# Patient Record
Sex: MALE | NOT HISPANIC OR LATINO | URBAN - METROPOLITAN AREA
[De-identification: names, ages, dates, MRNs, and addresses within clinical notes are randomized per-mention and may not be internally consistent; named-entity substitution may affect disease eponyms.]

---

## 2023-05-01 LAB
ALANINE AMINOTRANSFERASE (SGPT) (U/L) IN SER/PLAS: 31 U/L (ref 10–52)
ALBUMIN (G/DL) IN SER/PLAS: 4.6 G/DL (ref 3.4–5)
ALKALINE PHOSPHATASE (U/L) IN SER/PLAS: 69 U/L (ref 33–136)
ANION GAP IN SER/PLAS: 13 MMOL/L (ref 10–20)
ASPARTATE AMINOTRANSFERASE (SGOT) (U/L) IN SER/PLAS: 29 U/L (ref 9–39)
BASOPHILS (10*3/UL) IN BLOOD BY AUTOMATED COUNT: 0.11 X10E9/L (ref 0–0.1)
BASOPHILS/100 LEUKOCYTES IN BLOOD BY AUTOMATED COUNT: 1.2 % (ref 0–2)
BILIRUBIN TOTAL (MG/DL) IN SER/PLAS: 1.7 MG/DL (ref 0–1.2)
CALCIUM (MG/DL) IN SER/PLAS: 9.9 MG/DL (ref 8.6–10.3)
CARBON DIOXIDE, TOTAL (MMOL/L) IN SER/PLAS: 28 MMOL/L (ref 21–32)
CHLORIDE (MMOL/L) IN SER/PLAS: 103 MMOL/L (ref 98–107)
CHOLESTEROL (MG/DL) IN SER/PLAS: 131 MG/DL (ref 0–199)
CHOLESTEROL IN HDL (MG/DL) IN SER/PLAS: 48.5 MG/DL
CHOLESTEROL/HDL RATIO: 2.7
CREATININE (MG/DL) IN SER/PLAS: 0.87 MG/DL (ref 0.5–1.3)
EOSINOPHILS (10*3/UL) IN BLOOD BY AUTOMATED COUNT: 0.15 X10E9/L (ref 0–0.7)
EOSINOPHILS/100 LEUKOCYTES IN BLOOD BY AUTOMATED COUNT: 1.6 % (ref 0–6)
ERYTHROCYTE DISTRIBUTION WIDTH (RATIO) BY AUTOMATED COUNT: 12.9 % (ref 11.5–14.5)
ERYTHROCYTE MEAN CORPUSCULAR HEMOGLOBIN CONCENTRATION (G/DL) BY AUTOMATED: 32.8 G/DL (ref 32–36)
ERYTHROCYTE MEAN CORPUSCULAR VOLUME (FL) BY AUTOMATED COUNT: 95 FL (ref 80–100)
ERYTHROCYTES (10*6/UL) IN BLOOD BY AUTOMATED COUNT: 4.84 X10E12/L (ref 4.5–5.9)
GFR MALE: >90 ML/MIN/1.73M2
GLUCOSE (MG/DL) IN SER/PLAS: 89 MG/DL (ref 74–99)
HEMATOCRIT (%) IN BLOOD BY AUTOMATED COUNT: 46 % (ref 41–52)
HEMOGLOBIN (G/DL) IN BLOOD: 15.1 G/DL (ref 13.5–17.5)
IMMATURE GRANULOCYTES/100 LEUKOCYTES IN BLOOD BY AUTOMATED COUNT: 0.2 % (ref 0–0.9)
LDL: 67 MG/DL (ref 0–99)
LEUKOCYTES (10*3/UL) IN BLOOD BY AUTOMATED COUNT: 9.4 X10E9/L (ref 4.4–11.3)
LYMPHOCYTES (10*3/UL) IN BLOOD BY AUTOMATED COUNT: 2.95 X10E9/L (ref 1.2–4.8)
LYMPHOCYTES/100 LEUKOCYTES IN BLOOD BY AUTOMATED COUNT: 31.4 % (ref 13–44)
MONOCYTES (10*3/UL) IN BLOOD BY AUTOMATED COUNT: 0.77 X10E9/L (ref 0.1–1)
MONOCYTES/100 LEUKOCYTES IN BLOOD BY AUTOMATED COUNT: 8.2 % (ref 2–10)
NEUTROPHILS (10*3/UL) IN BLOOD BY AUTOMATED COUNT: 5.4 X10E9/L (ref 1.2–7.7)
NEUTROPHILS/100 LEUKOCYTES IN BLOOD BY AUTOMATED COUNT: 57.4 % (ref 40–80)
PLATELETS (10*3/UL) IN BLOOD AUTOMATED COUNT: 240 X10E9/L (ref 150–450)
POTASSIUM (MMOL/L) IN SER/PLAS: 3.8 MMOL/L (ref 3.5–5.3)
PROTEIN TOTAL: 7.2 G/DL (ref 6.4–8.2)
SEDIMENTATION RATE, ERYTHROCYTE: 6 MM/H (ref 0–20)
SODIUM (MMOL/L) IN SER/PLAS: 140 MMOL/L (ref 136–145)
THYROTROPIN (MIU/L) IN SER/PLAS BY DETECTION LIMIT <= 0.05 MIU/L: 1.5 MIU/L (ref 0.44–3.98)
TRIGLYCERIDE (MG/DL) IN SER/PLAS: 80 MG/DL (ref 0–149)
UREA NITROGEN (MG/DL) IN SER/PLAS: 15 MG/DL (ref 6–23)
VLDL: 16 MG/DL (ref 0–40)

## 2023-05-02 LAB
ESTIMATED AVERAGE GLUCOSE FOR HBA1C: 117 MG/DL
HEMOGLOBIN A1C/HEMOGLOBIN TOTAL IN BLOOD: 5.7 %
PROSTATE SPECIFIC AG (NG/ML) IN SER/PLAS: 1.92 NG/ML (ref 0–4)
TESTOSTERONE (NG/DL) IN SER/PLAS: 417 NG/DL (ref 240–1000)

## 2023-08-31 PROBLEM — E11.9 TYPE 2 DIABETES MELLITUS WITHOUT COMPLICATION, WITHOUT LONG-TERM CURRENT USE OF INSULIN (MULTI): Status: ACTIVE | Noted: 2023-08-31

## 2023-08-31 PROBLEM — M54.16 LUMBAR RADICULOPATHY: Status: ACTIVE | Noted: 2023-08-31

## 2023-08-31 PROBLEM — S76.019A STRAIN OF HIP FLEXOR: Status: ACTIVE | Noted: 2023-08-31

## 2023-08-31 PROBLEM — E78.2 MIXED HYPERLIPIDEMIA: Status: ACTIVE | Noted: 2023-08-31

## 2023-08-31 PROBLEM — G62.9 PERIPHERAL NEUROPATHY: Status: ACTIVE | Noted: 2023-08-31

## 2023-08-31 PROBLEM — R35.1 NOCTURIA: Status: ACTIVE | Noted: 2023-08-31

## 2023-08-31 PROBLEM — M25.662 DECREASED RANGE OF MOTION OF LEFT KNEE: Status: ACTIVE | Noted: 2023-08-31

## 2023-08-31 PROBLEM — G56.01 CARPAL TUNNEL SYNDROME OF RIGHT WRIST: Status: ACTIVE | Noted: 2023-08-31

## 2023-08-31 PROBLEM — R20.0 LEFT LEG NUMBNESS: Status: ACTIVE | Noted: 2023-08-31

## 2023-08-31 PROBLEM — M62.838 MUSCLE SPASM: Status: ACTIVE | Noted: 2023-08-31

## 2023-08-31 PROBLEM — R30.0 DYSURIA: Status: ACTIVE | Noted: 2023-08-31

## 2023-08-31 RX ORDER — POLYETHYLENE GLYCOL 3350, SODIUM CHLORIDE, SODIUM BICARBONATE, POTASSIUM CHLORIDE 420; 11.2; 5.72; 1.48 G/4L; G/4L; G/4L; G/4L
POWDER, FOR SOLUTION ORAL
COMMUNITY
Start: 2018-11-05 | End: 2023-11-20 | Stop reason: ALTCHOICE

## 2023-08-31 RX ORDER — GLYBURIDE 5 MG/1
0.5 TABLET ORAL DAILY
COMMUNITY
Start: 2017-05-09 | End: 2023-11-20 | Stop reason: ALTCHOICE

## 2023-08-31 RX ORDER — SIMVASTATIN 40 MG/1
1 TABLET, FILM COATED ORAL NIGHTLY
COMMUNITY
Start: 2017-08-07 | End: 2023-11-20 | Stop reason: SDUPTHER

## 2023-08-31 RX ORDER — TURMERIC 95 %
POWDER (GRAM) MISCELLANEOUS
COMMUNITY

## 2023-11-20 ENCOUNTER — OFFICE VISIT (OUTPATIENT)
Dept: PRIMARY CARE | Facility: CLINIC | Age: 63
End: 2023-11-20
Payer: COMMERCIAL

## 2023-11-20 VITALS
DIASTOLIC BLOOD PRESSURE: 76 MMHG | OXYGEN SATURATION: 97 % | BODY MASS INDEX: 27.32 KG/M2 | TEMPERATURE: 97.5 F | HEART RATE: 73 BPM | WEIGHT: 164.2 LBS | SYSTOLIC BLOOD PRESSURE: 124 MMHG

## 2023-11-20 DIAGNOSIS — E78.2 MIXED HYPERLIPIDEMIA: ICD-10-CM

## 2023-11-20 DIAGNOSIS — E11.9 TYPE 2 DIABETES MELLITUS WITHOUT COMPLICATION, WITHOUT LONG-TERM CURRENT USE OF INSULIN (MULTI): Primary | ICD-10-CM

## 2023-11-20 DIAGNOSIS — Z12.5 SCREENING FOR PROSTATE CANCER: ICD-10-CM

## 2023-11-20 DIAGNOSIS — M15.9 GENERALIZED OSTEOARTHRITIS OF MULTIPLE SITES: ICD-10-CM

## 2023-11-20 PROBLEM — S76.019A STRAIN OF HIP FLEXOR: Status: RESOLVED | Noted: 2023-08-31 | Resolved: 2023-11-20

## 2023-11-20 PROBLEM — M25.662 DECREASED RANGE OF MOTION OF LEFT KNEE: Status: RESOLVED | Noted: 2023-08-31 | Resolved: 2023-11-20

## 2023-11-20 PROBLEM — R20.0 LEFT LEG NUMBNESS: Status: RESOLVED | Noted: 2023-08-31 | Resolved: 2023-11-20

## 2023-11-20 PROBLEM — R30.0 DYSURIA: Status: RESOLVED | Noted: 2023-08-31 | Resolved: 2023-11-20

## 2023-11-20 PROBLEM — R35.1 NOCTURIA: Status: RESOLVED | Noted: 2023-08-31 | Resolved: 2023-11-20

## 2023-11-20 PROBLEM — M54.16 LUMBAR RADICULOPATHY: Status: RESOLVED | Noted: 2023-08-31 | Resolved: 2023-11-20

## 2023-11-20 PROBLEM — M62.838 MUSCLE SPASM: Status: RESOLVED | Noted: 2023-08-31 | Resolved: 2023-11-20

## 2023-11-20 LAB — POC HEMOGLOBIN A1C: 5.4 % (ref 4.2–6.5)

## 2023-11-20 PROCEDURE — 99213 OFFICE O/P EST LOW 20 MIN: CPT | Performed by: FAMILY MEDICINE

## 2023-11-20 PROCEDURE — 3078F DIAST BP <80 MM HG: CPT | Performed by: FAMILY MEDICINE

## 2023-11-20 PROCEDURE — 1036F TOBACCO NON-USER: CPT | Performed by: FAMILY MEDICINE

## 2023-11-20 PROCEDURE — 3044F HG A1C LEVEL LT 7.0%: CPT | Performed by: FAMILY MEDICINE

## 2023-11-20 PROCEDURE — 3074F SYST BP LT 130 MM HG: CPT | Performed by: FAMILY MEDICINE

## 2023-11-20 PROCEDURE — 83036 HEMOGLOBIN GLYCOSYLATED A1C: CPT | Performed by: FAMILY MEDICINE

## 2023-11-20 RX ORDER — ASPIRIN 81 MG/1
81 TABLET ORAL DAILY
COMMUNITY

## 2023-11-20 RX ORDER — SIMVASTATIN 40 MG/1
40 TABLET, FILM COATED ORAL NIGHTLY
Qty: 90 TABLET | Refills: 3 | Status: SHIPPED | OUTPATIENT
Start: 2023-11-20 | End: 2024-01-26 | Stop reason: SDUPTHER

## 2023-11-20 RX ORDER — MELOXICAM 15 MG/1
15 TABLET ORAL DAILY
Qty: 90 TABLET | Refills: 3 | Status: SHIPPED | OUTPATIENT
Start: 2023-11-20 | End: 2024-11-19

## 2023-11-20 ASSESSMENT — ENCOUNTER SYMPTOMS
LOSS OF SENSATION IN FEET: 0
OCCASIONAL FEELINGS OF UNSTEADINESS: 0
DEPRESSION: 0

## 2023-11-20 ASSESSMENT — PATIENT HEALTH QUESTIONNAIRE - PHQ9
2. FEELING DOWN, DEPRESSED OR HOPELESS: NOT AT ALL
1. LITTLE INTEREST OR PLEASURE IN DOING THINGS: NOT AT ALL
SUM OF ALL RESPONSES TO PHQ9 QUESTIONS 1 & 2: 0

## 2023-11-20 ASSESSMENT — LIFESTYLE VARIABLES
AUDIT-C TOTAL SCORE: 0
HOW MANY STANDARD DRINKS CONTAINING ALCOHOL DO YOU HAVE ON A TYPICAL DAY: PATIENT DOES NOT DRINK
HOW OFTEN DO YOU HAVE SIX OR MORE DRINKS ON ONE OCCASION: NEVER
HOW OFTEN DO YOU HAVE A DRINK CONTAINING ALCOHOL: NEVER
SKIP TO QUESTIONS 9-10: 1

## 2023-11-20 ASSESSMENT — PAIN SCALES - GENERAL: PAINLEVEL: 0-NO PAIN

## 2023-11-20 NOTE — PATIENT INSTRUCTIONS
For Dm2 - A1c is improved for 5.4.  Due for eye exam when you return.  Follow up in May.      Blood work 1 week prior.

## 2023-11-20 NOTE — PROGRESS NOTES
Subjective   Patient ID: Napoleon Merritt is a 63 y.o. male who presents for Diabetes.    For DM2:  A1c: 5.4  Follow up: Doing well.  Having dental work.    Hypoglycemic Episodes: none  Glucose monitoring:  rare    Dyslipidemia  -Patient presents for evaluation of lipids.   -Compliance with treatment thus far has been good.   -A repeat lipid profile was done.   -Medication: simvastatin           Review of Systems    Objective   /76 (BP Location: Right arm, Patient Position: Sitting)   Pulse 73   Temp 36.4 °C (97.5 °F) (Temporal)   Wt 74.5 kg (164 lb 3.2 oz)   SpO2 97%   BMI 27.32 kg/m²     Physical Exam  Vitals reviewed.   Constitutional:       General: He is not in acute distress.  Cardiovascular:      Rate and Rhythm: Normal rate and regular rhythm.   Pulmonary:      Effort: Pulmonary effort is normal.      Breath sounds: No wheezing or rhonchi.   Musculoskeletal:      Right lower leg: No edema.      Left lower leg: No edema.   Lymphadenopathy:      Cervical: No cervical adenopathy.   Neurological:      Mental Status: He is alert.         Assessment/Plan   Diagnoses and all orders for this visit:  Type 2 diabetes mellitus without complication, without long-term current use of insulin (CMS/[facility])  Mixed hyperlipidemia  Screening for prostate cancer

## 2024-01-17 ENCOUNTER — TELEPHONE (OUTPATIENT)
Dept: PRIMARY CARE | Facility: CLINIC | Age: 64
End: 2024-01-17
Payer: COMMERCIAL

## 2024-01-26 DIAGNOSIS — E78.2 MIXED HYPERLIPIDEMIA: ICD-10-CM

## 2024-01-26 RX ORDER — SIMVASTATIN 40 MG/1
40 TABLET, FILM COATED ORAL NIGHTLY
Qty: 90 TABLET | Refills: 1 | Status: SHIPPED | OUTPATIENT
Start: 2024-01-26 | End: 2024-06-05 | Stop reason: SDUPTHER

## 2024-01-26 NOTE — TELEPHONE ENCOUNTER
Pt is calling for a 90 d supply. He's in [place] until spring.    Refill:  simvastatin (Zocor) 40 mg tablet Take 1 tablet (40 mg) by mouth once daily at bedtime.     Pharm:  [facility] [place]

## 2024-01-26 NOTE — TELEPHONE ENCOUNTER
Requested Prescriptions     Pending Prescriptions Disp Refills    simvastatin (Zocor) 40 mg tablet 90 tablet 1     Sig: Take 1 tablet (40 mg) by mouth once daily at bedtime.

## 2024-05-22 ENCOUNTER — LAB (OUTPATIENT)
Dept: LAB | Facility: LAB | Age: 64
End: 2024-05-22
Payer: COMMERCIAL

## 2024-05-22 ENCOUNTER — OFFICE VISIT (OUTPATIENT)
Dept: PRIMARY CARE | Facility: CLINIC | Age: 64
End: 2024-05-22
Payer: COMMERCIAL

## 2024-05-22 VITALS
OXYGEN SATURATION: 97 % | SYSTOLIC BLOOD PRESSURE: 128 MMHG | WEIGHT: 161.4 LBS | TEMPERATURE: 99.4 F | HEART RATE: 92 BPM | BODY MASS INDEX: 26.86 KG/M2 | DIASTOLIC BLOOD PRESSURE: 84 MMHG

## 2024-05-22 DIAGNOSIS — E11.9 TYPE 2 DIABETES MELLITUS WITHOUT COMPLICATION, WITHOUT LONG-TERM CURRENT USE OF INSULIN (MULTI): ICD-10-CM

## 2024-05-22 DIAGNOSIS — E78.2 MIXED HYPERLIPIDEMIA: ICD-10-CM

## 2024-05-22 DIAGNOSIS — Z12.5 SCREENING FOR PROSTATE CANCER: ICD-10-CM

## 2024-05-22 DIAGNOSIS — Z12.11 SCREEN FOR COLON CANCER: ICD-10-CM

## 2024-05-22 DIAGNOSIS — Z00.00 ROUTINE GENERAL MEDICAL EXAMINATION AT A HEALTH CARE FACILITY: Primary | ICD-10-CM

## 2024-05-22 DIAGNOSIS — R03.0 ELEVATED BLOOD PRESSURE READING IN OFFICE WITHOUT DIAGNOSIS OF HYPERTENSION: ICD-10-CM

## 2024-05-22 LAB
ALBUMIN SERPL BCP-MCNC: 4.9 G/DL (ref 3.4–5)
ALP SERPL-CCNC: 59 U/L (ref 33–136)
ALT SERPL W P-5'-P-CCNC: 23 U/L (ref 10–52)
ANION GAP SERPL CALC-SCNC: 13 MMOL/L (ref 10–20)
AST SERPL W P-5'-P-CCNC: 20 U/L (ref 9–39)
BASOPHILS # BLD AUTO: 0.09 X10*3/UL (ref 0–0.1)
BASOPHILS NFR BLD AUTO: 1.2 %
BILIRUB SERPL-MCNC: 1.8 MG/DL (ref 0–1.2)
BUN SERPL-MCNC: 18 MG/DL (ref 6–23)
CALCIUM SERPL-MCNC: 9.6 MG/DL (ref 8.6–10.3)
CHLORIDE SERPL-SCNC: 104 MMOL/L (ref 98–107)
CHOLEST SERPL-MCNC: 149 MG/DL (ref 0–199)
CHOLESTEROL/HDL RATIO: 3
CO2 SERPL-SCNC: 28 MMOL/L (ref 21–32)
CREAT SERPL-MCNC: 0.91 MG/DL (ref 0.5–1.3)
EGFRCR SERPLBLD CKD-EPI 2021: >90 ML/MIN/1.73M*2
EOSINOPHIL # BLD AUTO: 0.1 X10*3/UL (ref 0–0.7)
EOSINOPHIL NFR BLD AUTO: 1.3 %
ERYTHROCYTE [DISTWIDTH] IN BLOOD BY AUTOMATED COUNT: 12.2 % (ref 11.5–14.5)
GLUCOSE SERPL-MCNC: 103 MG/DL (ref 74–99)
HCT VFR BLD AUTO: 47.2 % (ref 41–52)
HDLC SERPL-MCNC: 50.3 MG/DL
HGB BLD-MCNC: 16.2 G/DL (ref 13.5–17.5)
IMM GRANULOCYTES # BLD AUTO: 0.04 X10*3/UL (ref 0–0.7)
IMM GRANULOCYTES NFR BLD AUTO: 0.5 % (ref 0–0.9)
LDLC SERPL CALC-MCNC: 79 MG/DL
LYMPHOCYTES # BLD AUTO: 2.36 X10*3/UL (ref 1.2–4.8)
LYMPHOCYTES NFR BLD AUTO: 31.4 %
MCH RBC QN AUTO: 31.6 PG (ref 26–34)
MCHC RBC AUTO-ENTMCNC: 34.3 G/DL (ref 32–36)
MCV RBC AUTO: 92 FL (ref 80–100)
MONOCYTES # BLD AUTO: 0.61 X10*3/UL (ref 0.1–1)
MONOCYTES NFR BLD AUTO: 8.1 %
NEUTROPHILS # BLD AUTO: 4.32 X10*3/UL (ref 1.2–7.7)
NEUTROPHILS NFR BLD AUTO: 57.5 %
NON HDL CHOLESTEROL: 99 MG/DL (ref 0–149)
NRBC BLD-RTO: 0 /100 WBCS (ref 0–0)
PLATELET # BLD AUTO: 235 X10*3/UL (ref 150–450)
POC HEMOGLOBIN A1C: 5.4 % (ref 4.2–6.5)
POTASSIUM SERPL-SCNC: 4.3 MMOL/L (ref 3.5–5.3)
PROT SERPL-MCNC: 7 G/DL (ref 6.4–8.2)
PSA SERPL-MCNC: 2.09 NG/ML
RBC # BLD AUTO: 5.13 X10*6/UL (ref 4.5–5.9)
SODIUM SERPL-SCNC: 141 MMOL/L (ref 136–145)
TRIGL SERPL-MCNC: 100 MG/DL (ref 0–149)
VLDL: 20 MG/DL (ref 0–40)
WBC # BLD AUTO: 7.5 X10*3/UL (ref 4.4–11.3)

## 2024-05-22 PROCEDURE — 99396 PREV VISIT EST AGE 40-64: CPT | Performed by: FAMILY MEDICINE

## 2024-05-22 PROCEDURE — 82043 UR ALBUMIN QUANTITATIVE: CPT

## 2024-05-22 PROCEDURE — 80061 LIPID PANEL: CPT

## 2024-05-22 PROCEDURE — 83036 HEMOGLOBIN GLYCOSYLATED A1C: CPT | Performed by: FAMILY MEDICINE

## 2024-05-22 PROCEDURE — 80053 COMPREHEN METABOLIC PANEL: CPT

## 2024-05-22 PROCEDURE — 3079F DIAST BP 80-89 MM HG: CPT | Performed by: FAMILY MEDICINE

## 2024-05-22 PROCEDURE — 84153 ASSAY OF PSA TOTAL: CPT

## 2024-05-22 PROCEDURE — 85025 COMPLETE CBC W/AUTO DIFF WBC: CPT

## 2024-05-22 PROCEDURE — 36415 COLL VENOUS BLD VENIPUNCTURE: CPT

## 2024-05-22 PROCEDURE — 1036F TOBACCO NON-USER: CPT | Performed by: FAMILY MEDICINE

## 2024-05-22 PROCEDURE — 3074F SYST BP LT 130 MM HG: CPT | Performed by: FAMILY MEDICINE

## 2024-05-22 PROCEDURE — 82570 ASSAY OF URINE CREATININE: CPT

## 2024-05-22 ASSESSMENT — ENCOUNTER SYMPTOMS
DEPRESSION: 0
LOSS OF SENSATION IN FEET: 0
OCCASIONAL FEELINGS OF UNSTEADINESS: 0

## 2024-05-22 ASSESSMENT — LIFESTYLE VARIABLES
HOW OFTEN DO YOU HAVE SIX OR MORE DRINKS ON ONE OCCASION: NEVER
HOW MANY STANDARD DRINKS CONTAINING ALCOHOL DO YOU HAVE ON A TYPICAL DAY: PATIENT DOES NOT DRINK
HOW OFTEN DO YOU HAVE A DRINK CONTAINING ALCOHOL: NEVER
SKIP TO QUESTIONS 9-10: 1
AUDIT-C TOTAL SCORE: 0

## 2024-05-22 ASSESSMENT — PAIN SCALES - GENERAL: PAINLEVEL: 0-NO PAIN

## 2024-05-22 ASSESSMENT — PATIENT HEALTH QUESTIONNAIRE - PHQ9
2. FEELING DOWN, DEPRESSED OR HOPELESS: NOT AT ALL
SUM OF ALL RESPONSES TO PHQ9 QUESTIONS 1 & 2: 0
1. LITTLE INTEREST OR PLEASURE IN DOING THINGS: NOT AT ALL

## 2024-05-22 NOTE — PROGRESS NOTES
Subjective   Patient ID: Napoleon Merritt is a 64 y.o. male who presents for Annual Exam.    Here for annual physical    Blood pressure - pt has been having elevated readings.  Patient states readings have been in the 140's/mid 90's. No medications currently.  Asymptomatic. Family history of heart disease and stroke.     For DM2:  A1c: 5.4  Follow up: Doing well.  Having dental work.    Hypoglycemic Episodes: none  Glucose monitoring:  rare    Dyslipidemia  -Patient presents for evaluation of lipids.   -Compliance with treatment thus far has been good.   -A repeat lipid profile was done.   -Medication: simvastatin           Review of Systems    Objective   /82 (BP Location: Right arm, Patient Position: Sitting)   Pulse 92   Temp 37.4 °C (99.4 °F) (Temporal)   Wt 73.2 kg (161 lb 6.4 oz)   SpO2 97%   BMI 26.86 kg/m²     Physical Exam  Vitals reviewed.   Constitutional:       General: He is not in acute distress.  Cardiovascular:      Rate and Rhythm: Normal rate and regular rhythm.   Pulmonary:      Effort: Pulmonary effort is normal.      Breath sounds: No wheezing or rhonchi.   Musculoskeletal:      Right lower leg: No edema.      Left lower leg: No edema.   Lymphadenopathy:      Cervical: No cervical adenopathy.   Neurological:      Mental Status: He is alert.         Assessment/Plan   Diagnoses and all orders for this visit:  Routine general medical examination at a health care facility  Type 2 diabetes mellitus without complication, without long-term current use of insulin (Multi)  Mixed hyperlipidemia  Elevated blood pressure reading in office without diagnosis of hypertension  Screen for colon cancer    Patient Instructions   Here for physical.  Order for blood work in system.  Order for cologuard.     For DM2- A1c is 5.4.  Diet controlled.  Continue exercise, weight loss.      For blood pressure - readings have been up and down.  Recommend nurse visits for 4 weeks.  If normal we will continue  lifestyle changes.  If elevated we will start low dose of losartan.      Recheck in 6 months.

## 2024-05-22 NOTE — PATIENT INSTRUCTIONS
Here for physical.  Order for blood work in system.  Order for cologuard.     For DM2- A1c is 5.4.  Diet controlled.  Continue exercise, weight loss.      For blood pressure - readings have been up and down.  Recommend nurse visits for 4 weeks.  If normal we will continue lifestyle changes.  If elevated we will start low dose of losartan.      Recheck in 6 months.

## 2024-05-23 LAB
CREAT UR-MCNC: 77.8 MG/DL (ref 20–370)
MICROALBUMIN UR-MCNC: <7 MG/L
MICROALBUMIN/CREAT UR: NORMAL MG/G{CREAT}

## 2024-05-29 ENCOUNTER — CLINICAL SUPPORT (OUTPATIENT)
Dept: PRIMARY CARE | Facility: CLINIC | Age: 64
End: 2024-05-29
Payer: COMMERCIAL

## 2024-05-29 VITALS — SYSTOLIC BLOOD PRESSURE: 128 MMHG | DIASTOLIC BLOOD PRESSURE: 82 MMHG

## 2024-05-29 DIAGNOSIS — R03.0 ELEVATED BLOOD PRESSURE READING IN OFFICE WITHOUT DIAGNOSIS OF HYPERTENSION: ICD-10-CM

## 2024-06-05 ENCOUNTER — CLINICAL SUPPORT (OUTPATIENT)
Dept: PRIMARY CARE | Facility: CLINIC | Age: 64
End: 2024-06-05
Payer: COMMERCIAL

## 2024-06-05 VITALS — DIASTOLIC BLOOD PRESSURE: 78 MMHG | OXYGEN SATURATION: 97 % | HEART RATE: 83 BPM | SYSTOLIC BLOOD PRESSURE: 124 MMHG

## 2024-06-05 DIAGNOSIS — R03.0 ELEVATED BLOOD PRESSURE READING IN OFFICE WITHOUT DIAGNOSIS OF HYPERTENSION: ICD-10-CM

## 2024-06-05 DIAGNOSIS — E78.2 MIXED HYPERLIPIDEMIA: ICD-10-CM

## 2024-06-05 RX ORDER — SIMVASTATIN 40 MG/1
40 TABLET, FILM COATED ORAL NIGHTLY
Qty: 90 TABLET | Refills: 1 | Status: SHIPPED | OUTPATIENT
Start: 2024-06-05

## 2024-06-05 ASSESSMENT — PAIN SCALES - GENERAL: PAINLEVEL: 0-NO PAIN

## 2024-06-12 ENCOUNTER — APPOINTMENT (OUTPATIENT)
Dept: PRIMARY CARE | Facility: CLINIC | Age: 64
End: 2024-06-12
Payer: COMMERCIAL

## 2024-06-14 LAB — NONINV COLON CA DNA+OCC BLD SCRN STL QL: NEGATIVE

## 2024-06-19 ENCOUNTER — APPOINTMENT (OUTPATIENT)
Dept: PRIMARY CARE | Facility: CLINIC | Age: 64
End: 2024-06-19
Payer: COMMERCIAL

## 2024-09-09 ENCOUNTER — TELEPHONE (OUTPATIENT)
Dept: PRIMARY CARE | Facility: CLINIC | Age: 64
End: 2024-09-09
Payer: COMMERCIAL

## 2024-09-09 DIAGNOSIS — E78.2 MIXED HYPERLIPIDEMIA: ICD-10-CM

## 2024-09-09 RX ORDER — SIMVASTATIN 40 MG/1
40 TABLET, FILM COATED ORAL NIGHTLY
Qty: 90 TABLET | Refills: 3 | Status: SHIPPED | OUTPATIENT
Start: 2024-09-09

## 2024-11-22 ENCOUNTER — APPOINTMENT (OUTPATIENT)
Dept: PRIMARY CARE | Facility: CLINIC | Age: 64
End: 2024-11-22
Payer: COMMERCIAL

## 2024-11-26 ENCOUNTER — OFFICE VISIT (OUTPATIENT)
Dept: PRIMARY CARE | Facility: CLINIC | Age: 64
End: 2024-11-26
Payer: COMMERCIAL

## 2024-11-26 ENCOUNTER — LAB (OUTPATIENT)
Dept: LAB | Facility: LAB | Age: 64
End: 2024-11-26
Payer: COMMERCIAL

## 2024-11-26 VITALS
DIASTOLIC BLOOD PRESSURE: 84 MMHG | SYSTOLIC BLOOD PRESSURE: 146 MMHG | BODY MASS INDEX: 27.69 KG/M2 | OXYGEN SATURATION: 97 % | RESPIRATION RATE: 18 BRPM | HEART RATE: 77 BPM | WEIGHT: 166.2 LBS | HEIGHT: 65 IN | TEMPERATURE: 99.6 F

## 2024-11-26 DIAGNOSIS — Z00.00 ROUTINE GENERAL MEDICAL EXAMINATION AT A HEALTH CARE FACILITY: Primary | ICD-10-CM

## 2024-11-26 DIAGNOSIS — E78.2 MIXED HYPERLIPIDEMIA: ICD-10-CM

## 2024-11-26 DIAGNOSIS — E11.9 TYPE 2 DIABETES MELLITUS WITHOUT COMPLICATION, WITHOUT LONG-TERM CURRENT USE OF INSULIN (MULTI): ICD-10-CM

## 2024-11-26 DIAGNOSIS — I10 BENIGN ESSENTIAL HYPERTENSION: ICD-10-CM

## 2024-11-26 DIAGNOSIS — Z00.00 ROUTINE GENERAL MEDICAL EXAMINATION AT A HEALTH CARE FACILITY: ICD-10-CM

## 2024-11-26 LAB
ALBUMIN SERPL BCP-MCNC: 4.6 G/DL (ref 3.4–5)
ALP SERPL-CCNC: 66 U/L (ref 33–136)
ALT SERPL W P-5'-P-CCNC: 20 U/L (ref 10–52)
ANION GAP SERPL CALC-SCNC: 13 MMOL/L (ref 10–20)
AST SERPL W P-5'-P-CCNC: 20 U/L (ref 9–39)
BASOPHILS # BLD AUTO: 0.11 X10*3/UL (ref 0–0.1)
BASOPHILS NFR BLD AUTO: 1.4 %
BILIRUB SERPL-MCNC: 2.3 MG/DL (ref 0–1.2)
BUN SERPL-MCNC: 14 MG/DL (ref 6–23)
CALCIUM SERPL-MCNC: 9.4 MG/DL (ref 8.6–10.3)
CHLORIDE SERPL-SCNC: 104 MMOL/L (ref 98–107)
CHOLEST SERPL-MCNC: 139 MG/DL (ref 0–199)
CHOLESTEROL/HDL RATIO: 3.1
CO2 SERPL-SCNC: 27 MMOL/L (ref 21–32)
CREAT SERPL-MCNC: 0.78 MG/DL (ref 0.5–1.3)
EGFRCR SERPLBLD CKD-EPI 2021: >90 ML/MIN/1.73M*2
EOSINOPHIL # BLD AUTO: 0.12 X10*3/UL (ref 0–0.7)
EOSINOPHIL NFR BLD AUTO: 1.5 %
ERYTHROCYTE [DISTWIDTH] IN BLOOD BY AUTOMATED COUNT: 12.8 % (ref 11.5–14.5)
GLUCOSE SERPL-MCNC: 89 MG/DL (ref 74–99)
HCT VFR BLD AUTO: 47.9 % (ref 41–52)
HDLC SERPL-MCNC: 45.1 MG/DL
HGB BLD-MCNC: 15.4 G/DL (ref 13.5–17.5)
IMM GRANULOCYTES # BLD AUTO: 0.05 X10*3/UL (ref 0–0.7)
IMM GRANULOCYTES NFR BLD AUTO: 0.6 % (ref 0–0.9)
LDLC SERPL CALC-MCNC: 69 MG/DL
LYMPHOCYTES # BLD AUTO: 2.31 X10*3/UL (ref 1.2–4.8)
LYMPHOCYTES NFR BLD AUTO: 28.5 %
MCH RBC QN AUTO: 31 PG (ref 26–34)
MCHC RBC AUTO-ENTMCNC: 32.2 G/DL (ref 32–36)
MCV RBC AUTO: 97 FL (ref 80–100)
MONOCYTES # BLD AUTO: 0.68 X10*3/UL (ref 0.1–1)
MONOCYTES NFR BLD AUTO: 8.4 %
NEUTROPHILS # BLD AUTO: 4.84 X10*3/UL (ref 1.2–7.7)
NEUTROPHILS NFR BLD AUTO: 59.6 %
NON HDL CHOLESTEROL: 94 MG/DL (ref 0–149)
NRBC BLD-RTO: 0 /100 WBCS (ref 0–0)
PLATELET # BLD AUTO: 233 X10*3/UL (ref 150–450)
POC HEMOGLOBIN A1C: 5.4 % (ref 4.2–6.5)
POTASSIUM SERPL-SCNC: 4.1 MMOL/L (ref 3.5–5.3)
PROT SERPL-MCNC: 6.7 G/DL (ref 6.4–8.2)
RBC # BLD AUTO: 4.96 X10*6/UL (ref 4.5–5.9)
SODIUM SERPL-SCNC: 140 MMOL/L (ref 136–145)
TRIGL SERPL-MCNC: 123 MG/DL (ref 0–149)
TSH SERPL-ACNC: 0.91 MIU/L (ref 0.44–3.98)
VLDL: 25 MG/DL (ref 0–40)
WBC # BLD AUTO: 8.1 X10*3/UL (ref 4.4–11.3)

## 2024-11-26 PROCEDURE — 85025 COMPLETE CBC W/AUTO DIFF WBC: CPT

## 2024-11-26 PROCEDURE — 3077F SYST BP >= 140 MM HG: CPT | Performed by: FAMILY MEDICINE

## 2024-11-26 PROCEDURE — 3008F BODY MASS INDEX DOCD: CPT | Performed by: FAMILY MEDICINE

## 2024-11-26 PROCEDURE — 4010F ACE/ARB THERAPY RXD/TAKEN: CPT | Performed by: FAMILY MEDICINE

## 2024-11-26 PROCEDURE — 80053 COMPREHEN METABOLIC PANEL: CPT

## 2024-11-26 PROCEDURE — 3062F POS MACROALBUMINURIA REV: CPT | Performed by: FAMILY MEDICINE

## 2024-11-26 PROCEDURE — 84443 ASSAY THYROID STIM HORMONE: CPT

## 2024-11-26 PROCEDURE — 3079F DIAST BP 80-89 MM HG: CPT | Performed by: FAMILY MEDICINE

## 2024-11-26 PROCEDURE — 36415 COLL VENOUS BLD VENIPUNCTURE: CPT

## 2024-11-26 PROCEDURE — 99396 PREV VISIT EST AGE 40-64: CPT | Performed by: FAMILY MEDICINE

## 2024-11-26 PROCEDURE — 3048F LDL-C <100 MG/DL: CPT | Performed by: FAMILY MEDICINE

## 2024-11-26 PROCEDURE — 1036F TOBACCO NON-USER: CPT | Performed by: FAMILY MEDICINE

## 2024-11-26 PROCEDURE — 80061 LIPID PANEL: CPT

## 2024-11-26 PROCEDURE — 83036 HEMOGLOBIN GLYCOSYLATED A1C: CPT | Performed by: FAMILY MEDICINE

## 2024-11-26 RX ORDER — LOSARTAN POTASSIUM 25 MG/1
25 TABLET ORAL DAILY
Qty: 100 TABLET | Refills: 3 | Status: SHIPPED | OUTPATIENT
Start: 2024-11-26 | End: 2025-12-31

## 2024-11-26 ASSESSMENT — ENCOUNTER SYMPTOMS
OCCASIONAL FEELINGS OF UNSTEADINESS: 0
DEPRESSION: 0
LOSS OF SENSATION IN FEET: 0

## 2024-11-26 ASSESSMENT — PATIENT HEALTH QUESTIONNAIRE - PHQ9
1. LITTLE INTEREST OR PLEASURE IN DOING THINGS: NOT AT ALL
2. FEELING DOWN, DEPRESSED OR HOPELESS: NOT AT ALL
SUM OF ALL RESPONSES TO PHQ9 QUESTIONS 1 & 2: 0

## 2024-11-26 ASSESSMENT — LIFESTYLE VARIABLES
HOW OFTEN DO YOU HAVE SIX OR MORE DRINKS ON ONE OCCASION: NEVER
HOW MANY STANDARD DRINKS CONTAINING ALCOHOL DO YOU HAVE ON A TYPICAL DAY: PATIENT DOES NOT DRINK
AUDIT-C TOTAL SCORE: 0
HOW OFTEN DO YOU HAVE A DRINK CONTAINING ALCOHOL: NEVER
SKIP TO QUESTIONS 9-10: 1

## 2024-11-26 ASSESSMENT — PAIN SCALES - GENERAL: PAINLEVEL_OUTOF10: 0-NO PAIN

## 2024-11-26 NOTE — PROGRESS NOTES
"Subjective   Patient ID: Napoleon Merritt is a 64 y.o. male who presents for No chief complaint on file..    Here for physical.     Elevated BP  -Pt has been having elevated systolic readings - 135-150 systolic.  Diastolic between 80-95.     For DM2:  A1c: 5.4  Follow up: Doing well.  No new issues.    Hypoglycemic Episodes: none  Glucose monitoring:  rare    Dyslipidemia  -Patient presents for evaluation of lipids.   -Compliance with treatment thus far has been good.   -A repeat lipid profile was done.   -Medication: simvastatin           Review of Systems    Objective   /84 (BP Location: Right arm, Patient Position: Sitting)   Pulse 77   Temp 37.6 °C (99.6 °F) (Temporal)   Resp 18   Ht 1.651 m (5' 5\")   Wt 75.4 kg (166 lb 3.2 oz)   SpO2 97%   BMI 27.66 kg/m²     Physical Exam  Vitals reviewed.   Constitutional:       General: He is not in acute distress.  Cardiovascular:      Rate and Rhythm: Normal rate and regular rhythm.   Pulmonary:      Effort: Pulmonary effort is normal.      Breath sounds: No wheezing or rhonchi.   Musculoskeletal:      Right lower leg: No edema.      Left lower leg: No edema.   Lymphadenopathy:      Cervical: No cervical adenopathy.   Neurological:      Mental Status: He is alert.         Assessment/Plan   Diagnoses and all orders for this visit:  Routine general medical examination at a health care facility  Type 2 diabetes mellitus without complication, without long-term current use of insulin (Multi)  -     POCT glycosylated hemoglobin (Hb A1C) manually resulted  Mixed hyperlipidemia  Benign essential hypertension  Other orders  -     Follow Up In Primary Care - Established    Patient Instructions   Here for physical.  Up to date on prostate and colon cancer testing.      For diabetes - A1c is 5.4 - well controlled.  Diet controlled.      For cholesterol - recheck levels.     For elevated blood pressure - discussed treating.  Check out [email or web address].org for meters.  Recommend " low dose of losartan 25mg.  Please check blood pressure 2-3 times a week. Goal is <140/90. If you are above this frequently, please call for medication adjustment. Recommend upper arm electronic cuff.  We will call in 3 weeks to see how you are doing    Follow up in 6 months.

## 2024-11-26 NOTE — PATIENT INSTRUCTIONS
Here for physical.  Up to date on prostate and colon cancer testing.      For diabetes - A1c is 5.4 - well controlled.  Diet controlled.      For cholesterol - recheck levels.     For elevated blood pressure - discussed treating.  Check out [email or web address] for meters.  Recommend low dose of losartan 25mg.  Please check blood pressure 2-3 times a week. Goal is <140/90. If you are above this frequently, please call for medication adjustment. Recommend upper arm electronic cuff.  We will call in 3 weeks to see how you are doing    Follow up in 6 months.

## 2024-11-27 DIAGNOSIS — R17 ELEVATED BILIRUBIN: Primary | ICD-10-CM

## 2024-11-29 ENCOUNTER — HOSPITAL ENCOUNTER (OUTPATIENT)
Dept: RADIOLOGY | Facility: HOSPITAL | Age: 64
Discharge: HOME | End: 2024-11-29
Payer: COMMERCIAL

## 2024-11-29 DIAGNOSIS — R17 ELEVATED BILIRUBIN: ICD-10-CM

## 2024-11-29 PROCEDURE — 76705 ECHO EXAM OF ABDOMEN: CPT

## 2024-12-02 DIAGNOSIS — R17 ELEVATED BILIRUBIN: Primary | ICD-10-CM

## 2024-12-05 ENCOUNTER — HOSPITAL ENCOUNTER (EMERGENCY)
Facility: HOSPITAL | Age: 64
Discharge: HOME | End: 2024-12-06
Attending: EMERGENCY MEDICINE
Payer: COMMERCIAL

## 2024-12-05 ENCOUNTER — APPOINTMENT (OUTPATIENT)
Dept: RADIOLOGY | Facility: HOSPITAL | Age: 64
End: 2024-12-05
Payer: COMMERCIAL

## 2024-12-05 DIAGNOSIS — N23 URETERAL COLIC: Primary | ICD-10-CM

## 2024-12-05 LAB
ALBUMIN SERPL BCP-MCNC: 4.4 G/DL (ref 3.4–5)
ALP SERPL-CCNC: 62 U/L (ref 33–136)
ALT SERPL W P-5'-P-CCNC: 23 U/L (ref 10–52)
AMORPH CRY #/AREA UR COMP ASSIST: ABNORMAL /HPF
ANION GAP SERPL CALC-SCNC: 16 MMOL/L (ref 10–20)
APPEARANCE UR: ABNORMAL
AST SERPL W P-5'-P-CCNC: 27 U/L (ref 9–39)
BASOPHILS # BLD AUTO: 0.11 X10*3/UL (ref 0–0.1)
BASOPHILS NFR BLD AUTO: 0.8 %
BILIRUB SERPL-MCNC: 2.4 MG/DL (ref 0–1.2)
BILIRUB UR STRIP.AUTO-MCNC: NEGATIVE MG/DL
BUN SERPL-MCNC: 25 MG/DL (ref 6–23)
CALCIUM SERPL-MCNC: 9.3 MG/DL (ref 8.6–10.3)
CHLORIDE SERPL-SCNC: 103 MMOL/L (ref 98–107)
CO2 SERPL-SCNC: 24 MMOL/L (ref 21–32)
COLOR UR: ABNORMAL
CREAT SERPL-MCNC: 1.33 MG/DL (ref 0.5–1.3)
EGFRCR SERPLBLD CKD-EPI 2021: 60 ML/MIN/1.73M*2
EOSINOPHIL # BLD AUTO: 0.09 X10*3/UL (ref 0–0.7)
EOSINOPHIL NFR BLD AUTO: 0.7 %
ERYTHROCYTE [DISTWIDTH] IN BLOOD BY AUTOMATED COUNT: 12.5 % (ref 11.5–14.5)
GLUCOSE SERPL-MCNC: 137 MG/DL (ref 74–99)
GLUCOSE UR STRIP.AUTO-MCNC: NORMAL MG/DL
HCT VFR BLD AUTO: 41.2 % (ref 41–52)
HGB BLD-MCNC: 14.4 G/DL (ref 13.5–17.5)
HYALINE CASTS #/AREA URNS AUTO: ABNORMAL /LPF
IMM GRANULOCYTES # BLD AUTO: 0.06 X10*3/UL (ref 0–0.7)
IMM GRANULOCYTES NFR BLD AUTO: 0.5 % (ref 0–0.9)
KETONES UR STRIP.AUTO-MCNC: ABNORMAL MG/DL
LEUKOCYTE ESTERASE UR QL STRIP.AUTO: NEGATIVE
LYMPHOCYTES # BLD AUTO: 1.38 X10*3/UL (ref 1.2–4.8)
LYMPHOCYTES NFR BLD AUTO: 10.6 %
MCH RBC QN AUTO: 31 PG (ref 26–34)
MCHC RBC AUTO-ENTMCNC: 35 G/DL (ref 32–36)
MCV RBC AUTO: 89 FL (ref 80–100)
MONOCYTES # BLD AUTO: 0.65 X10*3/UL (ref 0.1–1)
MONOCYTES NFR BLD AUTO: 5 %
NEUTROPHILS # BLD AUTO: 10.72 X10*3/UL (ref 1.2–7.7)
NEUTROPHILS NFR BLD AUTO: 82.4 %
NITRITE UR QL STRIP.AUTO: NEGATIVE
NRBC BLD-RTO: 0 /100 WBCS (ref 0–0)
PH UR STRIP.AUTO: 8 [PH]
PLATELET # BLD AUTO: 237 X10*3/UL (ref 150–450)
POTASSIUM SERPL-SCNC: 4.4 MMOL/L (ref 3.5–5.3)
PROT SERPL-MCNC: 6.7 G/DL (ref 6.4–8.2)
PROT UR STRIP.AUTO-MCNC: ABNORMAL MG/DL
RBC # BLD AUTO: 4.65 X10*6/UL (ref 4.5–5.9)
RBC # UR STRIP.AUTO: ABNORMAL /UL
RBC #/AREA URNS AUTO: >20 /HPF
SODIUM SERPL-SCNC: 139 MMOL/L (ref 136–145)
SP GR UR STRIP.AUTO: 1.01
UROBILINOGEN UR STRIP.AUTO-MCNC: NORMAL MG/DL
WBC # BLD AUTO: 13 X10*3/UL (ref 4.4–11.3)
WBC #/AREA URNS AUTO: ABNORMAL /HPF

## 2024-12-05 PROCEDURE — 96375 TX/PRO/DX INJ NEW DRUG ADDON: CPT

## 2024-12-05 PROCEDURE — 80053 COMPREHEN METABOLIC PANEL: CPT | Performed by: EMERGENCY MEDICINE

## 2024-12-05 PROCEDURE — 36415 COLL VENOUS BLD VENIPUNCTURE: CPT | Performed by: EMERGENCY MEDICINE

## 2024-12-05 PROCEDURE — 74176 CT ABD & PELVIS W/O CONTRAST: CPT | Performed by: RADIOLOGY

## 2024-12-05 PROCEDURE — 99284 EMERGENCY DEPT VISIT MOD MDM: CPT | Mod: 25 | Performed by: EMERGENCY MEDICINE

## 2024-12-05 PROCEDURE — 85025 COMPLETE CBC W/AUTO DIFF WBC: CPT | Performed by: EMERGENCY MEDICINE

## 2024-12-05 PROCEDURE — 2500000004 HC RX 250 GENERAL PHARMACY W/ HCPCS (ALT 636 FOR OP/ED): Performed by: EMERGENCY MEDICINE

## 2024-12-05 PROCEDURE — 74176 CT ABD & PELVIS W/O CONTRAST: CPT

## 2024-12-05 PROCEDURE — 81001 URINALYSIS AUTO W/SCOPE: CPT | Performed by: EMERGENCY MEDICINE

## 2024-12-05 PROCEDURE — 96374 THER/PROPH/DIAG INJ IV PUSH: CPT

## 2024-12-05 RX ORDER — ONDANSETRON HYDROCHLORIDE 2 MG/ML
4 INJECTION, SOLUTION INTRAVENOUS ONCE
Status: COMPLETED | OUTPATIENT
Start: 2024-12-05 | End: 2024-12-05

## 2024-12-05 RX ORDER — KETOROLAC TROMETHAMINE 30 MG/ML
15 INJECTION, SOLUTION INTRAMUSCULAR; INTRAVENOUS ONCE
Status: COMPLETED | OUTPATIENT
Start: 2024-12-05 | End: 2024-12-05

## 2024-12-05 ASSESSMENT — PAIN DESCRIPTION - PAIN TYPE
TYPE: ACUTE PAIN
TYPE: ACUTE PAIN

## 2024-12-05 ASSESSMENT — LIFESTYLE VARIABLES
EVER FELT BAD OR GUILTY ABOUT YOUR DRINKING: NO
HAVE PEOPLE ANNOYED YOU BY CRITICIZING YOUR DRINKING: NO
HAVE YOU EVER FELT YOU SHOULD CUT DOWN ON YOUR DRINKING: NO
EVER HAD A DRINK FIRST THING IN THE MORNING TO STEADY YOUR NERVES TO GET RID OF A HANGOVER: NO
TOTAL SCORE: 0

## 2024-12-05 ASSESSMENT — COLUMBIA-SUICIDE SEVERITY RATING SCALE - C-SSRS
6. HAVE YOU EVER DONE ANYTHING, STARTED TO DO ANYTHING, OR PREPARED TO DO ANYTHING TO END YOUR LIFE?: NO
1. IN THE PAST MONTH, HAVE YOU WISHED YOU WERE DEAD OR WISHED YOU COULD GO TO SLEEP AND NOT WAKE UP?: NO
2. HAVE YOU ACTUALLY HAD ANY THOUGHTS OF KILLING YOURSELF?: NO

## 2024-12-05 ASSESSMENT — PAIN SCALES - GENERAL
PAINLEVEL_OUTOF10: 8
PAINLEVEL_OUTOF10: 4
PAINLEVEL_OUTOF10: 10 - WORST POSSIBLE PAIN

## 2024-12-05 ASSESSMENT — PAIN - FUNCTIONAL ASSESSMENT
PAIN_FUNCTIONAL_ASSESSMENT: 0-10
PAIN_FUNCTIONAL_ASSESSMENT: 0-10

## 2024-12-05 ASSESSMENT — PAIN DESCRIPTION - ORIENTATION
ORIENTATION: LEFT
ORIENTATION: LEFT

## 2024-12-06 VITALS
HEIGHT: 77 IN | DIASTOLIC BLOOD PRESSURE: 74 MMHG | TEMPERATURE: 98.3 F | WEIGHT: 173.5 LBS | HEART RATE: 97 BPM | BODY MASS INDEX: 20.49 KG/M2 | OXYGEN SATURATION: 98 % | RESPIRATION RATE: 20 BRPM | SYSTOLIC BLOOD PRESSURE: 111 MMHG

## 2024-12-06 LAB — HOLD SPECIMEN: NORMAL

## 2024-12-06 RX ORDER — OXYCODONE AND ACETAMINOPHEN 5; 325 MG/1; MG/1
1 TABLET ORAL EVERY 6 HOURS PRN
Qty: 12 TABLET | Refills: 0 | Status: SHIPPED | OUTPATIENT
Start: 2024-12-06 | End: 2024-12-09

## 2024-12-06 RX ORDER — ONDANSETRON 4 MG/1
4 TABLET, ORALLY DISINTEGRATING ORAL EVERY 8 HOURS PRN
Qty: 20 TABLET | Refills: 0 | Status: SHIPPED | OUTPATIENT
Start: 2024-12-06 | End: 2024-12-13

## 2024-12-06 NOTE — ED TRIAGE NOTES
Pt arrived via EMS for complaint of L sided flank pain. He said the pain started a few days ago but has gotten worse over the last few hours. He denies any CP and SOB. Pt was given 50mg fentanyl and 4mg of Zofran.

## 2024-12-06 NOTE — ED PROVIDER NOTES
HPI   Chief Complaint   Patient presents with    Flank Pain       Patient presents to the emergency department secondary to left flank pain.  Patient states that he had a little bit of a twinge of pain over the last couple days but sudden onset of worsening pain in the last couple hours.  No nausea or vomiting.  Patient had normal bowel movement today.  He has noticed darkening of the urine today.  He is never had symptoms like this in the past.  Patient was recently started on blood pressure medication.  He is also on cholesterol medications.  He is not on blood thinners.  He is not a diabetic.  No chest pain or shortness of breath.  No cough or congestion.  No other symptoms at this time.                          [place] Coma Scale Score: 15                  Patient History   Past Medical History:   Diagnosis Date    Other specified health status     No pertinent past medical history    Other tear of medial meniscus, current injury, unspecified knee, initial encounter 02/26/2020    Medial meniscus tear     Past Surgical History:   Procedure Laterality Date    KNEE SURGERY Left     OTHER SURGICAL HISTORY  01/15/2020    No history of surgery     Family History   Problem Relation Name Age of Onset    Cancer Mother      Melanoma Mother      Hypertension Mother      Diabetes Father       Social History     Tobacco Use    Smoking status: Never    Smokeless tobacco: Never   Vaping Use    Vaping status: Never Used   Substance Use Topics    Alcohol use: Never    Drug use: Never       Physical Exam   ED Triage Vitals [12/05/24 2148]   Temp Heart Rate Respirations BP   -- 81 14 (!) 142/100      Pulse Ox Temp src Heart Rate Source Patient Position   100 % -- Monitor Sitting      BP Location FiO2 (%)     -- --       Physical Exam  Vitals and nursing note reviewed.   Constitutional:       Appearance: Normal appearance.   HENT:      Head: Normocephalic.      Nose: Nose normal.      Mouth/Throat:      Mouth: Mucous membranes are  moist.   Eyes:      Extraocular Movements: Extraocular movements intact.      Pupils: Pupils are equal, round, and reactive to light.   Cardiovascular:      Rate and Rhythm: Normal rate and regular rhythm.      Pulses: Normal pulses.      Heart sounds: Normal heart sounds.   Pulmonary:      Effort: Pulmonary effort is normal.      Breath sounds: Normal breath sounds.   Abdominal:      General: Abdomen is flat. Bowel sounds are normal.      Palpations: Abdomen is soft.      Comments: Patient has mild left upper quadrant and left flank tenderness to palpation.  No guarding or rebound.   Musculoskeletal:         General: Normal range of motion.      Cervical back: Normal range of motion.      Right lower leg: No edema.      Left lower leg: No edema.   Skin:     General: Skin is warm and dry.      Capillary Refill: Capillary refill takes less than 2 seconds.   Neurological:      General: No focal deficit present.      Mental Status: He is alert and oriented to person, place, and time.   Psychiatric:         Mood and Affect: Mood normal.         Behavior: Behavior normal.       Labs Reviewed - No data to display  Pain Management Panel           No data to display              No orders to display     ED Course & MDM   Diagnoses as of 12/06/24 0043   Ureteral colic       Medical Decision Making  Patient presents with left flank pain.  Differential diagnosis for this patient is ureterolithiasis, gastroenteritis, pyelonephritis, nonspecific abdominal pain or other acute cause.  Patient is evaluated in the emergency department with CT abdomen pelvis without contrast, CBC, CMP and EKG.  EKG was obtained at 10:01 PM.  It is sinus rhythm rate of 78.  No acute ST elevation.  [place] interval is 164 and QTc is 428.  Patient is given 15 mg of Toradol for pain and 4 mg of Zofran for nausea.  Patient feels significantly improved after treatment.  He is currently pain-free.  CT scan does show evidence of a 5 mm distal left ureteral stone.   No evidence of urinary tract infection at this time.  Patient is stable for discharge.  He was informed of all results.  He is going to follow-up with urology as an outpatient.  He should return for new or worsening symptoms.  Prescriptions for Percocet and Zofran are sent to his pharmacy of choice.        Procedure  Procedures     Lupe Mcneal MD  12/06/24 0044

## 2024-12-09 ENCOUNTER — TELEPHONE (OUTPATIENT)
Dept: PRIMARY CARE | Facility: CLINIC | Age: 64
End: 2024-12-09

## 2024-12-09 ENCOUNTER — ANESTHESIA EVENT (OUTPATIENT)
Dept: OPERATING ROOM | Facility: HOSPITAL | Age: 64
End: 2024-12-09

## 2024-12-09 ENCOUNTER — OFFICE VISIT (OUTPATIENT)
Dept: UROLOGY | Facility: CLINIC | Age: 64
End: 2024-12-09
Payer: COMMERCIAL

## 2024-12-09 ENCOUNTER — HOSPITAL ENCOUNTER (OUTPATIENT)
Facility: HOSPITAL | Age: 64
Setting detail: OUTPATIENT SURGERY
End: 2024-12-09
Attending: UROLOGY | Admitting: UROLOGY
Payer: COMMERCIAL

## 2024-12-09 VITALS
HEIGHT: 66 IN | WEIGHT: 165 LBS | SYSTOLIC BLOOD PRESSURE: 127 MMHG | BODY MASS INDEX: 26.52 KG/M2 | DIASTOLIC BLOOD PRESSURE: 80 MMHG | HEART RATE: 79 BPM

## 2024-12-09 DIAGNOSIS — E78.2 MIXED HYPERLIPIDEMIA: ICD-10-CM

## 2024-12-09 DIAGNOSIS — N20.0 KIDNEY STONE: ICD-10-CM

## 2024-12-09 LAB
POC APPEARANCE, URINE: CLEAR
POC BILIRUBIN, URINE: NEGATIVE
POC BLOOD, URINE: ABNORMAL
POC COLOR, URINE: YELLOW
POC GLUCOSE, URINE: NEGATIVE MG/DL
POC KETONES, URINE: NEGATIVE MG/DL
POC LEUKOCYTES, URINE: NEGATIVE
POC NITRITE,URINE: NEGATIVE
POC PH, URINE: 6 PH
POC PROTEIN, URINE: NEGATIVE MG/DL
POC SPECIFIC GRAVITY, URINE: <=1.005
POC UROBILINOGEN, URINE: 0.2 EU/DL

## 2024-12-09 PROCEDURE — 3062F POS MACROALBUMINURIA REV: CPT | Performed by: UROLOGY

## 2024-12-09 PROCEDURE — 3079F DIAST BP 80-89 MM HG: CPT | Performed by: UROLOGY

## 2024-12-09 PROCEDURE — 4010F ACE/ARB THERAPY RXD/TAKEN: CPT | Performed by: UROLOGY

## 2024-12-09 PROCEDURE — 3074F SYST BP LT 130 MM HG: CPT | Performed by: UROLOGY

## 2024-12-09 PROCEDURE — 81003 URINALYSIS AUTO W/O SCOPE: CPT | Performed by: UROLOGY

## 2024-12-09 PROCEDURE — 3048F LDL-C <100 MG/DL: CPT | Performed by: UROLOGY

## 2024-12-09 PROCEDURE — 99204 OFFICE O/P NEW MOD 45 MIN: CPT | Performed by: UROLOGY

## 2024-12-09 PROCEDURE — 3008F BODY MASS INDEX DOCD: CPT | Performed by: UROLOGY

## 2024-12-09 RX ORDER — LABETALOL HYDROCHLORIDE 5 MG/ML
5 INJECTION, SOLUTION INTRAVENOUS ONCE AS NEEDED
OUTPATIENT
Start: 2024-12-09

## 2024-12-09 RX ORDER — DROPERIDOL 2.5 MG/ML
0.62 INJECTION, SOLUTION INTRAMUSCULAR; INTRAVENOUS ONCE AS NEEDED
OUTPATIENT
Start: 2024-12-09

## 2024-12-09 RX ORDER — HYDRALAZINE HYDROCHLORIDE 20 MG/ML
5 INJECTION INTRAMUSCULAR; INTRAVENOUS EVERY 30 MIN PRN
OUTPATIENT
Start: 2024-12-09

## 2024-12-09 RX ORDER — FAMOTIDINE 10 MG/ML
20 INJECTION INTRAVENOUS ONCE
OUTPATIENT
Start: 2024-12-09 | End: 2024-12-09

## 2024-12-09 RX ORDER — ONDANSETRON HYDROCHLORIDE 2 MG/ML
4 INJECTION, SOLUTION INTRAVENOUS ONCE AS NEEDED
OUTPATIENT
Start: 2024-12-09

## 2024-12-09 RX ORDER — SIMVASTATIN 40 MG/1
40 TABLET, FILM COATED ORAL NIGHTLY
Qty: 90 TABLET | Refills: 3 | Status: SHIPPED | OUTPATIENT
Start: 2024-12-09

## 2024-12-09 RX ORDER — ALBUTEROL SULFATE 0.83 MG/ML
2.5 SOLUTION RESPIRATORY (INHALATION) ONCE AS NEEDED
OUTPATIENT
Start: 2024-12-09

## 2024-12-09 RX ORDER — MEPERIDINE HYDROCHLORIDE 25 MG/ML
12.5 INJECTION INTRAMUSCULAR; INTRAVENOUS; SUBCUTANEOUS EVERY 10 MIN PRN
OUTPATIENT
Start: 2024-12-09

## 2024-12-09 RX ORDER — LIDOCAINE HYDROCHLORIDE 10 MG/ML
0.1 INJECTION, SOLUTION EPIDURAL; INFILTRATION; INTRACAUDAL; PERINEURAL ONCE
OUTPATIENT
Start: 2024-12-09 | End: 2024-12-09

## 2024-12-09 RX ORDER — MORPHINE SULFATE 2 MG/ML
2 INJECTION, SOLUTION INTRAMUSCULAR; INTRAVENOUS EVERY 5 MIN PRN
OUTPATIENT
Start: 2024-12-09

## 2024-12-09 RX ORDER — SODIUM CHLORIDE, SODIUM LACTATE, POTASSIUM CHLORIDE, CALCIUM CHLORIDE 600; 310; 30; 20 MG/100ML; MG/100ML; MG/100ML; MG/100ML
100 INJECTION, SOLUTION INTRAVENOUS CONTINUOUS
OUTPATIENT
Start: 2024-12-09 | End: 2024-12-09

## 2024-12-09 RX ORDER — DIPHENHYDRAMINE HYDROCHLORIDE 50 MG/ML
12.5 INJECTION INTRAMUSCULAR; INTRAVENOUS ONCE AS NEEDED
OUTPATIENT
Start: 2024-12-09

## 2024-12-09 NOTE — TELEPHONE ENCOUNTER
Refill Request:    simvastatin (Zocor) 40 mg tablet Take 1 tablet (40 mg) by mouth once daily at bedtime.     # 90 Day supply    [facility] [phone]

## 2024-12-09 NOTE — PROGRESS NOTES
12/09/2024  64-year-old gentleman developed an acute onset left flank pain 2 days ago and is still discomfort on the right and perineum    Patient has no nausea, no vomiting, no fever.    CT scan demonstrated a 5 mm left distal ureteral stone, 4 mm left kidney stone      I talked to patient about options for the kidney stones, #1 watchful waiting, #2 surgical intervention, cystoscopy ureteroscopy holmium laser lithotripsy and stent insertion. All questions were answered, patient expressed understanding and agreed to the plan.    Impression  Left kidney stone  Left ureteral stone  Left flank pain    Plan:  Cystoscopy ureteroscopy holmium laser lithotripsy and left stent insertion;      Chief Complaint   Patient presents with    Nephrolithiasis     Pt here for kidney stone some burning, no blood since last week         Physical Exam     TODAYS LAB RESULTS:  POCT UA Automated manually resulted  Order: [id number]   Status: Final result       Visible to patient: Yes (not seen)       Next appt: 05/29/2025 at 10:00 AM in Primary Care (Casimiro Moscoso DO)       Dx: Kidney stone    0 Result Notes      Component  Ref Range & Units 14:08   POC Color, Urine  Straw, Yellow, Light-Yellow Yellow   POC Appearance, Urine  Clear Clear   POC Glucose, Urine  NEGATIVE mg/dl NEGATIVE   POC Bilirubin, Urine  NEGATIVE NEGATIVE   POC Ketones, Urine  NEGATIVE mg/dl NEGATIVE   POC Specific Gravity, Urine  1.005 - 1.035 <=1.005   POC Blood, Urine  NEGATIVE MODERATE (2+) Abnormal    POC PH, Urine  No Reference Range Established PH 6.0   POC Protein, Urine  NEGATIVE, 30 (1+) mg/dl NEGATIVE   POC Urobilinogen, Urine  0.2, 1.0 EU/DL 0.2   Poc Nitrite, Urine  NEGATIVE NEGATIVE   POC Leukocytes, Urine  NEGATIVE NEGATIVE          ASSESSMENT&PLAN:      IMPRESSIONS:      Left hydronephrosis and hydroureter with 5 mm calculus in the distal  left ureter.      Small nonobstructive left renal calculi..      MACRO:

## 2024-12-10 ENCOUNTER — ANESTHESIA (OUTPATIENT)
Dept: OPERATING ROOM | Facility: HOSPITAL | Age: 64
End: 2024-12-10

## 2024-12-12 ENCOUNTER — APPOINTMENT (OUTPATIENT)
Dept: UROLOGY | Facility: CLINIC | Age: 64
End: 2024-12-12
Payer: COMMERCIAL

## 2024-12-12 ENCOUNTER — TELEPHONE (OUTPATIENT)
Dept: UROLOGY | Facility: CLINIC | Age: 64
End: 2024-12-12

## 2024-12-12 DIAGNOSIS — N20.0 KIDNEY STONE: Primary | ICD-10-CM

## 2024-12-12 DIAGNOSIS — N40.1 BENIGN PROSTATIC HYPERPLASIA WITH LOWER URINARY TRACT SYMPTOMS, SYMPTOM DETAILS UNSPECIFIED: ICD-10-CM

## 2024-12-12 RX ORDER — TAMSULOSIN HYDROCHLORIDE 0.4 MG/1
0.4 CAPSULE ORAL DAILY
Qty: 90 CAPSULE | Refills: 3 | Status: SHIPPED | OUTPATIENT
Start: 2024-12-12

## 2024-12-12 NOTE — TELEPHONE ENCOUNTER
Patient was in beginning of week, passed stones, he has been passing more, has blood in urine and thinks is trying to pass more stones.  Is there something that he can get that will help him?  [place]  Thnk you

## 2024-12-12 NOTE — TELEPHONE ENCOUNTER
Napoleon Merritt [birth year]  pt needs a re fill please on  Flomax.  [facility] .  Pt to check pharmacy after 6pm. Thank you

## 2024-12-17 ENCOUNTER — TELEPHONE (OUTPATIENT)
Dept: PRIMARY CARE | Facility: CLINIC | Age: 64
End: 2024-12-17
Payer: COMMERCIAL

## 2024-12-17 NOTE — TELEPHONE ENCOUNTER
----- Message from Casimiro Moscoso sent at 11/26/2024  1:26 PM EST -----  Call and see how BP is doing on losartan.

## 2024-12-20 ENCOUNTER — OFFICE VISIT (OUTPATIENT)
Dept: UROLOGY | Facility: CLINIC | Age: 64
End: 2024-12-20
Payer: COMMERCIAL

## 2024-12-20 VITALS
WEIGHT: 165 LBS | SYSTOLIC BLOOD PRESSURE: 147 MMHG | BODY MASS INDEX: 26.52 KG/M2 | HEIGHT: 66 IN | DIASTOLIC BLOOD PRESSURE: 78 MMHG | HEART RATE: 102 BPM

## 2024-12-20 DIAGNOSIS — N20.0 KIDNEY STONE: ICD-10-CM

## 2024-12-20 DIAGNOSIS — N39.0 URINARY TRACT INFECTION WITHOUT HEMATURIA, SITE UNSPECIFIED: ICD-10-CM

## 2024-12-20 DIAGNOSIS — N40.1 BENIGN PROSTATIC HYPERPLASIA WITH LOWER URINARY TRACT SYMPTOMS, SYMPTOM DETAILS UNSPECIFIED: Primary | ICD-10-CM

## 2024-12-20 LAB
POC APPEARANCE, URINE: CLEAR
POC BILIRUBIN, URINE: NEGATIVE
POC BLOOD, URINE: ABNORMAL
POC COLOR, URINE: YELLOW
POC GLUCOSE, URINE: NEGATIVE MG/DL
POC KETONES, URINE: NEGATIVE MG/DL
POC LEUKOCYTES, URINE: NEGATIVE
POC NITRITE,URINE: NEGATIVE
POC PH, URINE: 6 PH
POC PROTEIN, URINE: NEGATIVE MG/DL
POC SPECIFIC GRAVITY, URINE: 1.01
POC UROBILINOGEN, URINE: 0.2 EU/DL

## 2024-12-20 PROCEDURE — 3048F LDL-C <100 MG/DL: CPT | Performed by: UROLOGY

## 2024-12-20 PROCEDURE — 3062F POS MACROALBUMINURIA REV: CPT | Performed by: UROLOGY

## 2024-12-20 PROCEDURE — 99024 POSTOP FOLLOW-UP VISIT: CPT | Performed by: UROLOGY

## 2024-12-20 PROCEDURE — 81003 URINALYSIS AUTO W/O SCOPE: CPT | Performed by: UROLOGY

## 2024-12-20 PROCEDURE — 52000 CYSTOURETHROSCOPY: CPT | Performed by: UROLOGY

## 2024-12-20 PROCEDURE — 3078F DIAST BP <80 MM HG: CPT | Performed by: UROLOGY

## 2024-12-20 PROCEDURE — 3008F BODY MASS INDEX DOCD: CPT | Performed by: UROLOGY

## 2024-12-20 PROCEDURE — 3077F SYST BP >= 140 MM HG: CPT | Performed by: UROLOGY

## 2024-12-20 PROCEDURE — 4010F ACE/ARB THERAPY RXD/TAKEN: CPT | Performed by: UROLOGY

## 2024-12-20 RX ORDER — LIDOCAINE HYDROCHLORIDE 20 MG/ML
1 JELLY TOPICAL ONCE
Status: COMPLETED | OUTPATIENT
Start: 2024-12-20 | End: 2024-12-20

## 2024-12-20 RX ORDER — CIPROFLOXACIN 500 MG/1
500 TABLET ORAL 2 TIMES DAILY
Qty: 14 TABLET | Refills: 0 | Status: SHIPPED | OUTPATIENT
Start: 2024-12-20 | End: 2024-12-27

## 2024-12-20 RX ORDER — CIPROFLOXACIN 500 MG/1
500 TABLET ORAL ONCE
Status: COMPLETED | OUTPATIENT
Start: 2024-12-20 | End: 2024-12-20

## 2024-12-20 RX ORDER — PHENAZOPYRIDINE HYDROCHLORIDE 100 MG/1
100 TABLET, FILM COATED ORAL 3 TIMES DAILY PRN
Qty: 21 TABLET | Refills: 0 | Status: SHIPPED | OUTPATIENT
Start: 2024-12-20 | End: 2024-12-27

## 2024-12-20 NOTE — PROGRESS NOTES
12/20/2024  Complaining of urethral pain burning urination.  Patient did pass a 5 mm stone    Cystoscopy    A cystoscopy was performed under local without difficulty    Findings: Erythematous and edematous bulbar urethra but no stone; bladder unremarkable no stone no tumor    Pain evaluation: 0/10 before, 2/10 after.    We discussed ureteritis, passing stone, dysuria  We discussed cystoscopy result  We discussed the Pyridium and Cipro  All the questions were answered, the patient expressed understanding and agreed to the plan.    Impression  UTI, ureteritis  Left kidney stone  Left ureteral stone  Left flank pain     Plan:  Continue Flomax  Pyridium 200 mg 3 times a day for 7 days  Cipro 500 mg twice a day for 7 days  Follow-up as needed  Chief Complaint   Patient presents with    Urinary Frequency     Pt states it still burns and is very painful when urinating   Left kidney stone  Left ureteral stone  Left flank pain          Physical Exam     TODAYS LAB RESULTS:    Contains abnormal data POCT UA Automated manually resulted  Order: [id number]   Status: Final result       Visible to patient: Yes (not seen)       Next appt: 05/29/2025 at 10:00 AM in Primary Care (Casimiro Moscoso DO)       Dx: Kidney stone    0 Result Notes       Component  Ref Range & Units 07:26 11 d ago   POC Color, Urine  Straw, Yellow, Light-Yellow Yellow Yellow   POC Appearance, Urine  Clear Clear Clear   POC Glucose, Urine  NEGATIVE mg/dl NEGATIVE NEGATIVE   POC Bilirubin, Urine  NEGATIVE NEGATIVE NEGATIVE   POC Ketones, Urine  NEGATIVE mg/dl NEGATIVE NEGATIVE   POC Specific Gravity, Urine  1.005 - 1.035 1.015 <=1.005   POC Blood, Urine  NEGATIVE TRACE-Intact Abnormal  MODERATE (2+) Abnormal    POC PH, Urine  No Reference Range Established PH 6.0 6.0   POC Protein, Urine  NEGATIVE mg/dl NEGATIVE NEGATIVE R   POC Urobilinogen, Urine  0.2, 1.0 EU/DL 0.2 0.2   Poc Nitrite, Urine  NEGATIVE NEGATIVE NEGATIVE   POC Leukocytes, Urine  NEGATIVE  NEGATIVE NEGATIVE               ASSESSMENT&PLAN:      IMPRESSIONS:          12/09/2024  64-year-old gentleman developed an acute onset left flank pain 2 days ago and is still discomfort on the right and perineum     Patient has no nausea, no vomiting, no fever.     CT scan demonstrated a 5 mm left distal ureteral stone, 4 mm left kidney stone        I talked to patient about options for the kidney stones, #1 watchful waiting, #2 surgical intervention, cystoscopy ureteroscopy holmium laser lithotripsy and stent insertion. All questions were answered, patient expressed understanding and agreed to the plan.     Impression  Left kidney stone  Left ureteral stone  Left flank pain     Plan:  Cystoscopy ureteroscopy holmium laser lithotripsy and left stent insertion;

## 2025-01-27 ENCOUNTER — TELEPHONE (OUTPATIENT)
Dept: PRIMARY CARE | Facility: CLINIC | Age: 65
End: 2025-01-27
Payer: COMMERCIAL

## 2025-01-27 DIAGNOSIS — M15.9 GENERALIZED OSTEOARTHRITIS OF MULTIPLE SITES: ICD-10-CM

## 2025-01-27 RX ORDER — MELOXICAM 15 MG/1
15 TABLET ORAL DAILY
Qty: 90 TABLET | Refills: 3 | Status: SHIPPED | OUTPATIENT
Start: 2025-01-27 | End: 2026-01-27

## 2025-01-27 NOTE — TELEPHONE ENCOUNTER
[facility]  IS OUT OF THE OFFICE.    Refill Request:    meloxicam (Mobic) 15 mg tablet [[id number]]      Order Details  Dose: 15 mg Route: oral Frequency: Daily   Dispense Quantity: 90 tablet Refills: 3          Sig: Take 1 tablet (15 mg) by mouth once daily.         Start Date: 23 End Date: 24 after 365 doses   Written Date: 23 Rx Expiration Date: 24        Associated Diagnoses: Generalized osteoarthritis of multiple sites [M15.9]   Providers    Ordering and Authorizing Provider: Casimiro Moscoso DO NPI: [id number]   JOHN PAUL #: [id number]   Ordering User: Casimiro Moscoso DO      Pharmacy:  [facility] #[id number] [place] [id number] [facility] [address] Phone: [phone]   Fax: [phone]

## 2025-02-28 DIAGNOSIS — I10 BENIGN ESSENTIAL HYPERTENSION: ICD-10-CM

## 2025-02-28 DIAGNOSIS — R17 ELEVATED BILIRUBIN: Primary | ICD-10-CM

## 2025-02-28 DIAGNOSIS — E78.2 MIXED HYPERLIPIDEMIA: ICD-10-CM

## 2025-02-28 RX ORDER — LOSARTAN POTASSIUM 25 MG/1
25 TABLET ORAL DAILY
Qty: 100 TABLET | Refills: 3 | Status: SHIPPED | OUTPATIENT
Start: 2025-02-28 | End: 2026-04-04

## 2025-02-28 RX ORDER — SIMVASTATIN 40 MG/1
40 TABLET, FILM COATED ORAL NIGHTLY
Qty: 90 TABLET | Refills: 3 | Status: SHIPPED | OUTPATIENT
Start: 2025-02-28

## 2025-03-01 LAB
ALBUMIN SERPL-MCNC: 5.1 G/DL (ref 3.6–5.1)
ALBUMIN/GLOB SERPL: 2.2 (CALC) (ref 1–2.5)
ALP SERPL-CCNC: 63 U/L (ref 35–144)
ALT SERPL-CCNC: 16 U/L (ref 9–46)
AST SERPL-CCNC: 18 U/L (ref 10–35)
BILIRUB DIRECT SERPL-MCNC: 0.4 MG/DL
BILIRUB INDIRECT SERPL-MCNC: 2.4 MG/DL (CALC) (ref 0.2–1.2)
BILIRUB SERPL-MCNC: 2.8 MG/DL (ref 0.2–1.2)
GLOBULIN SER CALC-MCNC: 2.3 G/DL (CALC) (ref 1.9–3.7)
PROT SERPL-MCNC: 7.4 G/DL (ref 6.1–8.1)

## 2025-03-04 LAB
BASOPHILS # BLD AUTO: 101 CELLS/UL (ref 0–200)
BASOPHILS NFR BLD AUTO: 1.5 %
EOSINOPHIL # BLD AUTO: 127 CELLS/UL (ref 15–500)
EOSINOPHIL NFR BLD AUTO: 1.9 %
ERYTHROCYTE [DISTWIDTH] IN BLOOD BY AUTOMATED COUNT: 12.5 % (ref 11–15)
HCT VFR BLD AUTO: 43.4 % (ref 38.5–50)
HGB BLD-MCNC: 14.5 G/DL (ref 13.2–17.1)
LDH SERPL P TO L-CCNC: 146 U/L (ref 120–250)
LYMPHOCYTES # BLD AUTO: 2291 CELLS/UL (ref 850–3900)
LYMPHOCYTES NFR BLD AUTO: 34.2 %
MCH RBC QN AUTO: 31.6 PG (ref 27–33)
MCHC RBC AUTO-ENTMCNC: 33.4 G/DL (ref 32–36)
MCV RBC AUTO: 94.6 FL (ref 80–100)
MONOCYTES # BLD AUTO: 650 CELLS/UL (ref 200–950)
MONOCYTES NFR BLD AUTO: 9.7 %
NEUTROPHILS # BLD AUTO: 3531 CELLS/UL (ref 1500–7800)
NEUTROPHILS NFR BLD AUTO: 52.7 %
PLATELET # BLD AUTO: 226 THOUSAND/UL (ref 140–400)
PMV BLD REES-ECKER: 11.6 FL (ref 7.5–12.5)
RBC # BLD AUTO: 4.59 MILLION/UL (ref 4.2–5.8)
WBC # BLD AUTO: 6.7 THOUSAND/UL (ref 3.8–10.8)

## 2025-03-05 ENCOUNTER — HOSPITAL ENCOUNTER (OUTPATIENT)
Dept: RADIOLOGY | Facility: HOSPITAL | Age: 65
Discharge: HOME | End: 2025-03-05
Payer: COMMERCIAL

## 2025-03-05 DIAGNOSIS — R17 ELEVATED BILIRUBIN: ICD-10-CM

## 2025-03-05 PROCEDURE — 76705 ECHO EXAM OF ABDOMEN: CPT

## 2025-03-05 PROCEDURE — 76705 ECHO EXAM OF ABDOMEN: CPT | Performed by: RADIOLOGY

## 2025-05-29 ENCOUNTER — OFFICE VISIT (OUTPATIENT)
Dept: PRIMARY CARE | Facility: CLINIC | Age: 65
End: 2025-05-29
Payer: COMMERCIAL

## 2025-05-29 VITALS
WEIGHT: 171.4 LBS | DIASTOLIC BLOOD PRESSURE: 88 MMHG | SYSTOLIC BLOOD PRESSURE: 128 MMHG | OXYGEN SATURATION: 97 % | BODY MASS INDEX: 27.66 KG/M2 | HEART RATE: 83 BPM | TEMPERATURE: 99 F

## 2025-05-29 DIAGNOSIS — R74.8 ELEVATED LIVER ENZYMES: ICD-10-CM

## 2025-05-29 DIAGNOSIS — E78.2 MIXED HYPERLIPIDEMIA: ICD-10-CM

## 2025-05-29 DIAGNOSIS — N40.1 BENIGN PROSTATIC HYPERPLASIA WITH LOWER URINARY TRACT SYMPTOMS, SYMPTOM DETAILS UNSPECIFIED: ICD-10-CM

## 2025-05-29 DIAGNOSIS — E11.9 TYPE 2 DIABETES MELLITUS WITHOUT COMPLICATION, WITHOUT LONG-TERM CURRENT USE OF INSULIN: Primary | ICD-10-CM

## 2025-05-29 DIAGNOSIS — M25.50 ARTHRALGIA, UNSPECIFIED JOINT: ICD-10-CM

## 2025-05-29 DIAGNOSIS — I10 BENIGN ESSENTIAL HYPERTENSION: ICD-10-CM

## 2025-05-29 DIAGNOSIS — M15.9 GENERALIZED OSTEOARTHRITIS OF MULTIPLE SITES: ICD-10-CM

## 2025-05-29 PROCEDURE — 3079F DIAST BP 80-89 MM HG: CPT | Performed by: FAMILY MEDICINE

## 2025-05-29 PROCEDURE — 4010F ACE/ARB THERAPY RXD/TAKEN: CPT | Performed by: FAMILY MEDICINE

## 2025-05-29 PROCEDURE — 3074F SYST BP LT 130 MM HG: CPT | Performed by: FAMILY MEDICINE

## 2025-05-29 PROCEDURE — 1036F TOBACCO NON-USER: CPT | Performed by: FAMILY MEDICINE

## 2025-05-29 PROCEDURE — 99214 OFFICE O/P EST MOD 30 MIN: CPT | Performed by: FAMILY MEDICINE

## 2025-05-29 RX ORDER — MELOXICAM 15 MG/1
15 TABLET ORAL DAILY
Qty: 90 TABLET | Refills: 3 | Status: SHIPPED | OUTPATIENT
Start: 2025-05-29 | End: 2026-05-29

## 2025-05-29 RX ORDER — SIMVASTATIN 40 MG/1
40 TABLET, FILM COATED ORAL NIGHTLY
Qty: 90 TABLET | Refills: 3 | Status: SHIPPED | OUTPATIENT
Start: 2025-05-29

## 2025-05-29 RX ORDER — TAMSULOSIN HYDROCHLORIDE 0.4 MG/1
0.4 CAPSULE ORAL DAILY
Qty: 90 CAPSULE | Refills: 3 | Status: SHIPPED | OUTPATIENT
Start: 2025-05-29

## 2025-05-29 RX ORDER — LOSARTAN POTASSIUM 25 MG/1
25 TABLET ORAL DAILY
Qty: 100 TABLET | Refills: 3 | Status: SHIPPED | OUTPATIENT
Start: 2025-05-29 | End: 2026-07-03

## 2025-05-29 ASSESSMENT — ENCOUNTER SYMPTOMS
OCCASIONAL FEELINGS OF UNSTEADINESS: 0
DEPRESSION: 0
LOSS OF SENSATION IN FEET: 0

## 2025-05-29 ASSESSMENT — LIFESTYLE VARIABLES
AUDIT-C TOTAL SCORE: 0
SKIP TO QUESTIONS 9-10: 1
HOW MANY STANDARD DRINKS CONTAINING ALCOHOL DO YOU HAVE ON A TYPICAL DAY: PATIENT DOES NOT DRINK
HOW OFTEN DO YOU HAVE SIX OR MORE DRINKS ON ONE OCCASION: NEVER
HOW OFTEN DO YOU HAVE A DRINK CONTAINING ALCOHOL: NEVER

## 2025-05-29 ASSESSMENT — PAIN SCALES - GENERAL: PAINLEVEL_OUTOF10: 0-NO PAIN

## 2025-05-29 NOTE — PATIENT INSTRUCTIONS
Here for follow up.     For diabetes - we will give order for A1c (in office A1c unavailable).      For kidney function - we will recheck blood work and urine studies.      For liver enzymes - elevated - negative ultrasound.  Follow up with Dr. Feldman as scheduled.      For blood pressure - well controlled on losartan.      For arthritis - continue meloxicam - try cutting 1/2 tab to see if pain improved.  If not controlled then increase back to 15mg.

## 2025-05-29 NOTE — PROGRESS NOTES
Subjective   Patient ID: Josh Merritt is a 65 y.o. male who presents for Diabetes.    Here for follow up.     Patient has been working a lot - sold house in [place].  Working around his house.  Working in his stop a lot.  Patient has diffuse joint pain.  Using meloxicam - helping with joint pain.  Patient is concerned about the side effects.  Pt stopped for 1 week - joint pain significantly worsened.  Pt tried tylenol - no benefit.     Hypertension  -F/U: Doing well on losartan  -Home Monitoring:  checking frequently - well controlled.   -Cardiac symptoms: none.   -Patient denies chest pain, dyspnea, and lower extremity edema.   -Cardiologist: none      For DM2:  A1c: 5.4  Follow up: Doing well.  No new issues.    Hypoglycemic Episodes: none  Glucose monitoring:  rare    Dyslipidemia  -Patient presents for evaluation of lipids.   -Compliance with treatment thus far has been good.   -A repeat lipid profile was done.   -Medication: simvastatin           Review of Systems    Objective   /88 (BP Location: Right arm, Patient Position: Sitting)   Pulse 83   Temp 37.2 °C (99 °F) (Temporal)   Wt 77.7 kg (171 lb 6.4 oz)   SpO2 97%   BMI 27.66 kg/m²     Physical Exam  Vitals reviewed.   Constitutional:       General: He is not in acute distress.  Cardiovascular:      Rate and Rhythm: Normal rate and regular rhythm.   Pulmonary:      Effort: Pulmonary effort is normal.      Breath sounds: No wheezing or rhonchi.   Musculoskeletal:      Right lower leg: No edema.      Left lower leg: No edema.   Lymphadenopathy:      Cervical: No cervical adenopathy.   Neurological:      Mental Status: He is alert.         Assessment/Plan   Diagnoses and all orders for this visit:  Type 2 diabetes mellitus without complication, without long-term current use of insulin  -     Comprehensive Metabolic Panel; Future  -     Albumin-Creatinine Ratio, Urine Random; Future  -     Hemoglobin A1C; Future  Mixed hyperlipidemia  -     simvastatin  (Zocor) 40 mg tablet; Take 1 tablet (40 mg) by mouth once daily at bedtime.  Benign essential hypertension  -     losartan (Cozaar) 25 mg tablet; Take 1 tablet (25 mg) by mouth once daily.  Arthralgia, unspecified joint  Elevated liver enzymes  Benign prostatic hyperplasia with lower urinary tract symptoms, symptom details unspecified  -     tamsulosin (Flomax) 0.4 mg 24 hr capsule; Take 1 capsule (0.4 mg) by mouth once daily. Daily before bed  Generalized osteoarthritis of multiple sites  -     meloxicam (Mobic) 15 mg tablet; Take 1 tablet (15 mg) by mouth once daily.    Patient Instructions   Here for follow up.     For diabetes - we will give order for A1c (in office A1c unavailable).      For kidney function - we will recheck blood work and urine studies.      For liver enzymes - elevated - negative ultrasound.  Follow up with Dr. Feldman as scheduled.      For blood pressure - well controlled on losartan.      For arthritis - continue meloxicam - try cutting 1/2 tab to see if pain improved.  If not controlled then increase back to 15mg.        Follow up in 6 months. y

## 2025-05-30 LAB
ALBUMIN SERPL-MCNC: 4.9 G/DL (ref 3.6–5.1)
ALBUMIN/CREAT UR: NORMAL MG/G CREAT
ALP SERPL-CCNC: 66 U/L (ref 35–144)
ALT SERPL-CCNC: 30 U/L (ref 9–46)
ANION GAP SERPL CALCULATED.4IONS-SCNC: 7 MMOL/L (CALC) (ref 7–17)
AST SERPL-CCNC: 21 U/L (ref 10–35)
BILIRUB SERPL-MCNC: 1.9 MG/DL (ref 0.2–1.2)
BUN SERPL-MCNC: 13 MG/DL (ref 7–25)
CALCIUM SERPL-MCNC: 9.6 MG/DL (ref 8.6–10.3)
CHLORIDE SERPL-SCNC: 105 MMOL/L (ref 98–110)
CO2 SERPL-SCNC: 27 MMOL/L (ref 20–32)
CREAT SERPL-MCNC: 0.81 MG/DL (ref 0.7–1.35)
CREAT UR-MCNC: 67 MG/DL (ref 20–320)
EGFRCR SERPLBLD CKD-EPI 2021: 99 ML/MIN/1.73M2
EST. AVERAGE GLUCOSE BLD GHB EST-MCNC: 123 MG/DL
EST. AVERAGE GLUCOSE BLD GHB EST-SCNC: 6.8 MMOL/L
GLUCOSE SERPL-MCNC: 102 MG/DL (ref 65–99)
HBA1C MFR BLD: 5.9 %
MICROALBUMIN UR-MCNC: <0.2 MG/DL
POTASSIUM SERPL-SCNC: 4.4 MMOL/L (ref 3.5–5.3)
PROT SERPL-MCNC: 7.1 G/DL (ref 6.1–8.1)
SODIUM SERPL-SCNC: 139 MMOL/L (ref 135–146)

## 2025-07-30 ENCOUNTER — APPOINTMENT (OUTPATIENT)
Dept: GASTROENTEROLOGY | Facility: CLINIC | Age: 65
End: 2025-07-30
Payer: COMMERCIAL

## 2025-10-22 ENCOUNTER — APPOINTMENT (OUTPATIENT)
Dept: GASTROENTEROLOGY | Facility: CLINIC | Age: 65
End: 2025-10-22
Payer: COMMERCIAL